# Patient Record
Sex: FEMALE | Race: WHITE | NOT HISPANIC OR LATINO | Employment: OTHER | URBAN - METROPOLITAN AREA
[De-identification: names, ages, dates, MRNs, and addresses within clinical notes are randomized per-mention and may not be internally consistent; named-entity substitution may affect disease eponyms.]

---

## 2024-04-01 DIAGNOSIS — C34.91 PRIMARY ADENOCARCINOMA OF RIGHT LUNG (H): Primary | ICD-10-CM

## 2024-04-03 ENCOUNTER — HOSPITAL ENCOUNTER (OUTPATIENT)
Dept: CT IMAGING | Facility: CLINIC | Age: 84
Discharge: HOME | End: 2024-04-03
Attending: RADIOLOGY | Admitting: RADIOLOGY
Payer: MEDICARE

## 2024-04-03 DIAGNOSIS — C34.91 PRIMARY ADENOCARCINOMA OF RIGHT LUNG (H): ICD-10-CM

## 2024-04-03 LAB
CREAT BLD-MCNC: 1.7 MG/DL (ref 0.5–1)
EGFRCR SERPLBLD CKD-EPI 2021: 29 ML/MIN/1.73M2

## 2024-04-03 PROCEDURE — 250N000009 HC RX 250: Performed by: RADIOLOGY

## 2024-04-03 PROCEDURE — 82565 ASSAY OF CREATININE: CPT

## 2024-04-03 PROCEDURE — 71260 CT THORAX DX C+: CPT | Mod: MA

## 2024-04-03 PROCEDURE — 250N000011 HC RX IP 250 OP 636: Performed by: RADIOLOGY

## 2024-04-03 RX ORDER — IOPAMIDOL 755 MG/ML
88 INJECTION, SOLUTION INTRAVASCULAR ONCE
Status: COMPLETED | OUTPATIENT
Start: 2024-04-03 | End: 2024-04-03

## 2024-04-03 RX ADMIN — IOPAMIDOL 88 ML: 755 INJECTION, SOLUTION INTRAVENOUS at 12:48

## 2024-04-03 RX ADMIN — SODIUM CHLORIDE 60 ML: 9 INJECTION, SOLUTION INTRAVENOUS at 12:48

## 2024-05-26 ENCOUNTER — HOSPITAL ENCOUNTER (INPATIENT)
Facility: CLINIC | Age: 84
LOS: 1 days | Discharge: HOME | DRG: 809 | End: 2024-05-27
Attending: EMERGENCY MEDICINE | Admitting: STUDENT IN AN ORGANIZED HEALTH CARE EDUCATION/TRAINING PROGRAM
Payer: MEDICARE

## 2024-05-26 ENCOUNTER — APPOINTMENT (OUTPATIENT)
Dept: GENERAL RADIOLOGY | Facility: CLINIC | Age: 84
DRG: 809 | End: 2024-05-26
Attending: EMERGENCY MEDICINE
Payer: MEDICARE

## 2024-05-26 DIAGNOSIS — R53.1 GENERAL WEAKNESS: ICD-10-CM

## 2024-05-26 DIAGNOSIS — T45.1X5A ANTINEOPLASTIC CHEMOTHERAPY INDUCED PANCYTOPENIA (H): ICD-10-CM

## 2024-05-26 DIAGNOSIS — R06.09 DOE (DYSPNEA ON EXERTION): ICD-10-CM

## 2024-05-26 DIAGNOSIS — D61.810 ANTINEOPLASTIC CHEMOTHERAPY INDUCED PANCYTOPENIA (H): ICD-10-CM

## 2024-05-26 LAB
ALBUMIN SERPL BCG-MCNC: 3.5 G/DL (ref 3.5–5.2)
ALBUMIN UR-MCNC: NEGATIVE MG/DL
ALP SERPL-CCNC: 78 U/L (ref 40–150)
ALT SERPL W P-5'-P-CCNC: 19 U/L (ref 0–50)
ANION GAP SERPL CALCULATED.3IONS-SCNC: 10 MMOL/L (ref 7–15)
APPEARANCE UR: CLEAR
AST SERPL W P-5'-P-CCNC: 15 U/L (ref 0–45)
BASOPHILS # BLD AUTO: 0 10E3/UL (ref 0–0.2)
BASOPHILS NFR BLD AUTO: 0 %
BILIRUB SERPL-MCNC: 0.3 MG/DL
BILIRUB UR QL STRIP: NEGATIVE
BUN SERPL-MCNC: 23.1 MG/DL (ref 8–23)
CALCIUM SERPL-MCNC: 9.1 MG/DL (ref 8.8–10.2)
CHLORIDE SERPL-SCNC: 102 MMOL/L (ref 98–107)
COLOR UR AUTO: NORMAL
CREAT SERPL-MCNC: 1.51 MG/DL (ref 0.51–0.95)
D DIMER PPP FEU-MCNC: 0.33 UG/ML FEU (ref 0–0.5)
DEPRECATED HCO3 PLAS-SCNC: 26 MMOL/L (ref 22–29)
EGFRCR SERPLBLD CKD-EPI 2021: 34 ML/MIN/1.73M2
EOSINOPHIL # BLD AUTO: 0 10E3/UL (ref 0–0.7)
EOSINOPHIL NFR BLD AUTO: 1 %
ERYTHROCYTE [DISTWIDTH] IN BLOOD BY AUTOMATED COUNT: 17.6 % (ref 10–15)
FLUAV RNA SPEC QL NAA+PROBE: NEGATIVE
FLUBV RNA RESP QL NAA+PROBE: NEGATIVE
GLUCOSE SERPL-MCNC: 103 MG/DL (ref 70–99)
GLUCOSE UR STRIP-MCNC: NEGATIVE MG/DL
HCT VFR BLD AUTO: 27.6 % (ref 35–47)
HGB BLD-MCNC: 9.2 G/DL (ref 11.7–15.7)
HGB UR QL STRIP: NEGATIVE
HOLD SPECIMEN: NORMAL
IMM GRANULOCYTES # BLD: 0 10E3/UL
IMM GRANULOCYTES NFR BLD: 0 %
KETONES UR STRIP-MCNC: NEGATIVE MG/DL
LEUKOCYTE ESTERASE UR QL STRIP: NEGATIVE
LYMPHOCYTES # BLD AUTO: 0.4 10E3/UL (ref 0.8–5.3)
LYMPHOCYTES NFR BLD AUTO: 13 %
MAGNESIUM SERPL-MCNC: 1.5 MG/DL (ref 1.7–2.3)
MCH RBC QN AUTO: 34.1 PG (ref 26.5–33)
MCHC RBC AUTO-ENTMCNC: 33.3 G/DL (ref 31.5–36.5)
MCV RBC AUTO: 102 FL (ref 78–100)
MONOCYTES # BLD AUTO: 0.3 10E3/UL (ref 0–1.3)
MONOCYTES NFR BLD AUTO: 11 %
NEUTROPHILS # BLD AUTO: 2.1 10E3/UL (ref 1.6–8.3)
NEUTROPHILS NFR BLD AUTO: 75 %
NITRATE UR QL: NEGATIVE
NRBC # BLD AUTO: 0 10E3/UL
NRBC BLD AUTO-RTO: 0 /100
NT-PROBNP SERPL-MCNC: 606 PG/ML (ref 0–1800)
PH UR STRIP: 6.5 [PH] (ref 5–7)
PHOSPHATE SERPL-MCNC: 4.4 MG/DL (ref 2.5–4.5)
PLATELET # BLD AUTO: 116 10E3/UL (ref 150–450)
POTASSIUM SERPL-SCNC: 4.6 MMOL/L (ref 3.4–5.3)
PROCALCITONIN SERPL IA-MCNC: 0.04 NG/ML
PROT SERPL-MCNC: 6.1 G/DL (ref 6.4–8.3)
RBC # BLD AUTO: 2.7 10E6/UL (ref 3.8–5.2)
RBC URINE: 0 /HPF
RSV RNA SPEC NAA+PROBE: NEGATIVE
SARS-COV-2 RNA RESP QL NAA+PROBE: NEGATIVE
SODIUM SERPL-SCNC: 138 MMOL/L (ref 135–145)
SP GR UR STRIP: 1.01 (ref 1–1.03)
SQUAMOUS EPITHELIAL: 1 /HPF
TROPONIN T SERPL HS-MCNC: 20 NG/L
TROPONIN T SERPL HS-MCNC: 20 NG/L
UROBILINOGEN UR STRIP-MCNC: NORMAL MG/DL
WBC # BLD AUTO: 2.8 10E3/UL (ref 4–11)
WBC URINE: 3 /HPF

## 2024-05-26 PROCEDURE — 99222 1ST HOSP IP/OBS MODERATE 55: CPT | Performed by: NURSE PRACTITIONER

## 2024-05-26 PROCEDURE — 87040 BLOOD CULTURE FOR BACTERIA: CPT | Performed by: NURSE PRACTITIONER

## 2024-05-26 PROCEDURE — 250N000013 HC RX MED GY IP 250 OP 250 PS 637: Performed by: NURSE PRACTITIONER

## 2024-05-26 PROCEDURE — 81001 URINALYSIS AUTO W/SCOPE: CPT | Performed by: EMERGENCY MEDICINE

## 2024-05-26 PROCEDURE — 258N000003 HC RX IP 258 OP 636: Performed by: NURSE PRACTITIONER

## 2024-05-26 PROCEDURE — 83735 ASSAY OF MAGNESIUM: CPT | Performed by: EMERGENCY MEDICINE

## 2024-05-26 PROCEDURE — 36415 COLL VENOUS BLD VENIPUNCTURE: CPT | Performed by: EMERGENCY MEDICINE

## 2024-05-26 PROCEDURE — 96375 TX/PRO/DX INJ NEW DRUG ADDON: CPT

## 2024-05-26 PROCEDURE — 84145 PROCALCITONIN (PCT): CPT | Performed by: NURSE PRACTITIONER

## 2024-05-26 PROCEDURE — 258N000003 HC RX IP 258 OP 636: Performed by: EMERGENCY MEDICINE

## 2024-05-26 PROCEDURE — 96365 THER/PROPH/DIAG IV INF INIT: CPT

## 2024-05-26 PROCEDURE — 83880 ASSAY OF NATRIURETIC PEPTIDE: CPT | Performed by: EMERGENCY MEDICINE

## 2024-05-26 PROCEDURE — 93005 ELECTROCARDIOGRAM TRACING: CPT

## 2024-05-26 PROCEDURE — 71046 X-RAY EXAM CHEST 2 VIEWS: CPT

## 2024-05-26 PROCEDURE — 250N000011 HC RX IP 250 OP 636: Performed by: EMERGENCY MEDICINE

## 2024-05-26 PROCEDURE — 87637 SARSCOV2&INF A&B&RSV AMP PRB: CPT | Performed by: EMERGENCY MEDICINE

## 2024-05-26 PROCEDURE — 36415 COLL VENOUS BLD VENIPUNCTURE: CPT | Performed by: NURSE PRACTITIONER

## 2024-05-26 PROCEDURE — 96366 THER/PROPH/DIAG IV INF ADDON: CPT

## 2024-05-26 PROCEDURE — 87040 BLOOD CULTURE FOR BACTERIA: CPT | Performed by: EMERGENCY MEDICINE

## 2024-05-26 PROCEDURE — 250N000013 HC RX MED GY IP 250 OP 250 PS 637: Performed by: HOSPITALIST

## 2024-05-26 PROCEDURE — 84484 ASSAY OF TROPONIN QUANT: CPT | Performed by: EMERGENCY MEDICINE

## 2024-05-26 PROCEDURE — 80053 COMPREHEN METABOLIC PANEL: CPT | Performed by: EMERGENCY MEDICINE

## 2024-05-26 PROCEDURE — 120N000001 HC R&B MED SURG/OB

## 2024-05-26 PROCEDURE — 84100 ASSAY OF PHOSPHORUS: CPT | Performed by: NURSE PRACTITIONER

## 2024-05-26 PROCEDURE — 96361 HYDRATE IV INFUSION ADD-ON: CPT

## 2024-05-26 PROCEDURE — 85379 FIBRIN DEGRADATION QUANT: CPT | Performed by: EMERGENCY MEDICINE

## 2024-05-26 PROCEDURE — 99285 EMERGENCY DEPT VISIT HI MDM: CPT | Mod: 25

## 2024-05-26 PROCEDURE — 85025 COMPLETE CBC W/AUTO DIFF WBC: CPT | Performed by: EMERGENCY MEDICINE

## 2024-05-26 RX ORDER — SPIRONOLACTONE 25 MG/1
25 TABLET ORAL DAILY
COMMUNITY

## 2024-05-26 RX ORDER — CITALOPRAM HYDROBROMIDE 40 MG/1
40 TABLET ORAL DAILY
COMMUNITY

## 2024-05-26 RX ORDER — PRENATAL VIT/IRON FUM/FOLIC AC 27MG-0.8MG
1 TABLET ORAL DAILY
Status: DISCONTINUED | OUTPATIENT
Start: 2024-05-27 | End: 2024-05-27 | Stop reason: HOSPADM

## 2024-05-26 RX ORDER — FUROSEMIDE 20 MG
20 TABLET ORAL DAILY
Status: DISCONTINUED | OUTPATIENT
Start: 2024-05-27 | End: 2024-05-26

## 2024-05-26 RX ORDER — FLUTICASONE PROPIONATE 50 MCG
2 SPRAY, SUSPENSION (ML) NASAL DAILY
COMMUNITY

## 2024-05-26 RX ORDER — AMOXICILLIN 250 MG
1 CAPSULE ORAL 2 TIMES DAILY PRN
Status: DISCONTINUED | OUTPATIENT
Start: 2024-05-26 | End: 2024-05-27 | Stop reason: HOSPADM

## 2024-05-26 RX ORDER — ONDANSETRON 2 MG/ML
4 INJECTION INTRAMUSCULAR; INTRAVENOUS EVERY 6 HOURS PRN
Status: DISCONTINUED | OUTPATIENT
Start: 2024-05-26 | End: 2024-05-26

## 2024-05-26 RX ORDER — FUROSEMIDE 20 MG
20 TABLET ORAL DAILY
Status: ON HOLD | COMMUNITY
End: 2024-05-27

## 2024-05-26 RX ORDER — ALBUTEROL SULFATE 90 UG/1
1-2 AEROSOL, METERED RESPIRATORY (INHALATION) EVERY 4 HOURS PRN
COMMUNITY

## 2024-05-26 RX ORDER — ACETAMINOPHEN 325 MG/1
650 TABLET ORAL EVERY 4 HOURS PRN
Status: DISCONTINUED | OUTPATIENT
Start: 2024-05-26 | End: 2024-05-27 | Stop reason: HOSPADM

## 2024-05-26 RX ORDER — ONDANSETRON 4 MG/1
8 TABLET, FILM COATED ORAL EVERY 8 HOURS PRN
Status: DISCONTINUED | OUTPATIENT
Start: 2024-05-26 | End: 2024-05-26

## 2024-05-26 RX ORDER — FLUTICASONE PROPIONATE 50 MCG
2 SPRAY, SUSPENSION (ML) NASAL DAILY
Status: DISCONTINUED | OUTPATIENT
Start: 2024-05-27 | End: 2024-05-27 | Stop reason: HOSPADM

## 2024-05-26 RX ORDER — SIMETHICONE 80 MG
80 TABLET,CHEWABLE ORAL EVERY 6 HOURS PRN
Status: DISCONTINUED | OUTPATIENT
Start: 2024-05-26 | End: 2024-05-27 | Stop reason: HOSPADM

## 2024-05-26 RX ORDER — PROCHLORPERAZINE MALEATE 10 MG
10 TABLET ORAL EVERY 6 HOURS PRN
Status: DISCONTINUED | OUTPATIENT
Start: 2024-05-26 | End: 2024-05-26

## 2024-05-26 RX ORDER — FAMOTIDINE 20 MG/1
20 TABLET, FILM COATED ORAL 2 TIMES DAILY
Status: DISCONTINUED | OUTPATIENT
Start: 2024-05-26 | End: 2024-05-27 | Stop reason: HOSPADM

## 2024-05-26 RX ORDER — LIDOCAINE 40 MG/G
CREAM TOPICAL
Status: DISCONTINUED | OUTPATIENT
Start: 2024-05-26 | End: 2024-05-27 | Stop reason: HOSPADM

## 2024-05-26 RX ORDER — ALBUTEROL SULFATE 90 UG/1
1-2 AEROSOL, METERED RESPIRATORY (INHALATION) EVERY 4 HOURS PRN
Status: DISCONTINUED | OUTPATIENT
Start: 2024-05-26 | End: 2024-05-27 | Stop reason: HOSPADM

## 2024-05-26 RX ORDER — ONDANSETRON 2 MG/ML
4 INJECTION INTRAMUSCULAR; INTRAVENOUS ONCE
Status: COMPLETED | OUTPATIENT
Start: 2024-05-26 | End: 2024-05-26

## 2024-05-26 RX ORDER — PANTOPRAZOLE SODIUM 40 MG/1
40 TABLET, DELAYED RELEASE ORAL DAILY
Status: DISCONTINUED | OUTPATIENT
Start: 2024-05-27 | End: 2024-05-27 | Stop reason: HOSPADM

## 2024-05-26 RX ORDER — ONDANSETRON 2 MG/ML
4 INJECTION INTRAMUSCULAR; INTRAVENOUS EVERY 8 HOURS PRN
Status: DISCONTINUED | OUTPATIENT
Start: 2024-05-26 | End: 2024-05-27 | Stop reason: HOSPADM

## 2024-05-26 RX ORDER — IPRATROPIUM BROMIDE AND ALBUTEROL SULFATE 2.5; .5 MG/3ML; MG/3ML
3 SOLUTION RESPIRATORY (INHALATION) EVERY 4 HOURS PRN
Status: DISCONTINUED | OUTPATIENT
Start: 2024-05-26 | End: 2024-05-27 | Stop reason: HOSPADM

## 2024-05-26 RX ORDER — ATORVASTATIN CALCIUM 40 MG/1
40 TABLET, FILM COATED ORAL DAILY
COMMUNITY

## 2024-05-26 RX ORDER — ACETAMINOPHEN 650 MG/1
650 SUPPOSITORY RECTAL EVERY 4 HOURS PRN
Status: DISCONTINUED | OUTPATIENT
Start: 2024-05-26 | End: 2024-05-27 | Stop reason: HOSPADM

## 2024-05-26 RX ORDER — OMEPRAZOLE 20 MG/1
20 TABLET, DELAYED RELEASE ORAL DAILY
COMMUNITY

## 2024-05-26 RX ORDER — SPIRONOLACTONE 25 MG/1
25 TABLET ORAL DAILY
Status: DISCONTINUED | OUTPATIENT
Start: 2024-05-27 | End: 2024-05-26

## 2024-05-26 RX ORDER — FLUTICASONE FUROATE AND VILANTEROL 200; 25 UG/1; UG/1
1 POWDER RESPIRATORY (INHALATION) DAILY
COMMUNITY

## 2024-05-26 RX ORDER — CALCIUM CARBONATE 500 MG/1
1000 TABLET, CHEWABLE ORAL 4 TIMES DAILY PRN
Status: DISCONTINUED | OUTPATIENT
Start: 2024-05-26 | End: 2024-05-27 | Stop reason: HOSPADM

## 2024-05-26 RX ORDER — LANOLIN ALCOHOL/MO/W.PET/CERES
3 CREAM (GRAM) TOPICAL
Status: DISCONTINUED | OUTPATIENT
Start: 2024-05-26 | End: 2024-05-27 | Stop reason: HOSPADM

## 2024-05-26 RX ORDER — FLUTICASONE FUROATE AND VILANTEROL 200; 25 UG/1; UG/1
1 POWDER RESPIRATORY (INHALATION) DAILY
Status: DISCONTINUED | OUTPATIENT
Start: 2024-05-27 | End: 2024-05-27 | Stop reason: HOSPADM

## 2024-05-26 RX ORDER — AMOXICILLIN 250 MG
2 CAPSULE ORAL 2 TIMES DAILY PRN
Status: DISCONTINUED | OUTPATIENT
Start: 2024-05-26 | End: 2024-05-27 | Stop reason: HOSPADM

## 2024-05-26 RX ORDER — ONDANSETRON 8 MG/1
8 TABLET, FILM COATED ORAL EVERY 8 HOURS PRN
COMMUNITY

## 2024-05-26 RX ORDER — MAGNESIUM SULFATE HEPTAHYDRATE 40 MG/ML
2 INJECTION, SOLUTION INTRAVENOUS ONCE
Status: COMPLETED | OUTPATIENT
Start: 2024-05-26 | End: 2024-05-26

## 2024-05-26 RX ORDER — ATORVASTATIN CALCIUM 40 MG/1
40 TABLET, FILM COATED ORAL DAILY
Status: DISCONTINUED | OUTPATIENT
Start: 2024-05-27 | End: 2024-05-27 | Stop reason: HOSPADM

## 2024-05-26 RX ORDER — METOPROLOL SUCCINATE 50 MG/1
50 TABLET, EXTENDED RELEASE ORAL DAILY
COMMUNITY

## 2024-05-26 RX ORDER — CITALOPRAM HYDROBROMIDE 20 MG/1
40 TABLET ORAL DAILY
Status: DISCONTINUED | OUTPATIENT
Start: 2024-05-27 | End: 2024-05-27 | Stop reason: HOSPADM

## 2024-05-26 RX ORDER — METOPROLOL SUCCINATE 50 MG/1
50 TABLET, EXTENDED RELEASE ORAL DAILY
Status: DISCONTINUED | OUTPATIENT
Start: 2024-05-27 | End: 2024-05-27 | Stop reason: HOSPADM

## 2024-05-26 RX ORDER — CETIRIZINE HYDROCHLORIDE 10 MG/1
10 TABLET ORAL DAILY PRN
Status: DISCONTINUED | OUTPATIENT
Start: 2024-05-26 | End: 2024-05-27 | Stop reason: HOSPADM

## 2024-05-26 RX ORDER — FAMOTIDINE 20 MG/1
20 TABLET, FILM COATED ORAL 2 TIMES DAILY
COMMUNITY

## 2024-05-26 RX ORDER — AMOXICILLIN 500 MG
1200 CAPSULE ORAL DAILY
COMMUNITY

## 2024-05-26 RX ORDER — SODIUM CHLORIDE, SODIUM LACTATE, POTASSIUM CHLORIDE, CALCIUM CHLORIDE 600; 310; 30; 20 MG/100ML; MG/100ML; MG/100ML; MG/100ML
INJECTION, SOLUTION INTRAVENOUS CONTINUOUS
Status: DISCONTINUED | OUTPATIENT
Start: 2024-05-26 | End: 2024-05-27

## 2024-05-26 RX ORDER — CETIRIZINE HYDROCHLORIDE 10 MG/1
10 TABLET ORAL DAILY PRN
COMMUNITY

## 2024-05-26 RX ORDER — PROCHLORPERAZINE MALEATE 10 MG
10 TABLET ORAL EVERY 6 HOURS PRN
COMMUNITY

## 2024-05-26 RX ADMIN — ONDANSETRON 4 MG: 2 INJECTION INTRAMUSCULAR; INTRAVENOUS at 14:36

## 2024-05-26 RX ADMIN — Medication 3 MG: at 23:57

## 2024-05-26 RX ADMIN — ACETAMINOPHEN 650 MG: 325 TABLET, FILM COATED ORAL at 21:37

## 2024-05-26 RX ADMIN — FAMOTIDINE 20 MG: 20 TABLET ORAL at 21:37

## 2024-05-26 RX ADMIN — APIXABAN 2.5 MG: 2.5 TABLET, FILM COATED ORAL at 21:37

## 2024-05-26 RX ADMIN — MAGNESIUM SULFATE HEPTAHYDRATE 2 G: 40 INJECTION, SOLUTION INTRAVENOUS at 15:20

## 2024-05-26 RX ADMIN — SODIUM CHLORIDE, POTASSIUM CHLORIDE, SODIUM LACTATE AND CALCIUM CHLORIDE: 600; 310; 30; 20 INJECTION, SOLUTION INTRAVENOUS at 20:52

## 2024-05-26 RX ADMIN — SODIUM CHLORIDE 500 ML: 9 INJECTION, SOLUTION INTRAVENOUS at 14:32

## 2024-05-26 ASSESSMENT — ACTIVITIES OF DAILY LIVING (ADL)
ADLS_ACUITY_SCORE: 37
ADLS_ACUITY_SCORE: 35
ADLS_ACUITY_SCORE: 37
TOILETING_ISSUES: NO
DIFFICULTY_EATING/SWALLOWING: NO
ADLS_ACUITY_SCORE: 20
DIFFICULTY_COMMUNICATING: NO
CHANGE_IN_FUNCTIONAL_STATUS_SINCE_ONSET_OF_CURRENT_ILLNESS/INJURY: NO
ADLS_ACUITY_SCORE: 37
ADLS_ACUITY_SCORE: 35
WALKING_OR_CLIMBING_STAIRS_DIFFICULTY: NO
HEARING_DIFFICULTY_OR_DEAF: NO
WEAR_GLASSES_OR_BLIND: NO
ADLS_ACUITY_SCORE: 20
DOING_ERRANDS_INDEPENDENTLY_DIFFICULTY: NO
ADLS_ACUITY_SCORE: 37
FALL_HISTORY_WITHIN_LAST_SIX_MONTHS: NO
DRESSING/BATHING_DIFFICULTY: NO
CONCENTRATING,_REMEMBERING_OR_MAKING_DECISIONS_DIFFICULTY: NO

## 2024-05-26 ASSESSMENT — COLUMBIA-SUICIDE SEVERITY RATING SCALE - C-SSRS
6. HAVE YOU EVER DONE ANYTHING, STARTED TO DO ANYTHING, OR PREPARED TO DO ANYTHING TO END YOUR LIFE?: NO
2. HAVE YOU ACTUALLY HAD ANY THOUGHTS OF KILLING YOURSELF IN THE PAST MONTH?: NO
1. IN THE PAST MONTH, HAVE YOU WISHED YOU WERE DEAD OR WISHED YOU COULD GO TO SLEEP AND NOT WAKE UP?: NO

## 2024-05-26 NOTE — ED TRIAGE NOTES
Patient reports increasing fatigue and generalized weakness since yesterday. Hx of lung cancer and chemo.

## 2024-05-26 NOTE — ED PROVIDER NOTES
Emergency Department Note      History of Present Illness     Chief Complaint  Generalized Weakness and Fatigue    HPI  Andie Biswas is a 84 year old female on Eliquis with a history of lung cancer, hypertension, hyperlipidemia, and GERD, who presents to the ED for generalized weakness and fatigue. The symptoms started on Thursday (5/23/24) following her last round of chemotherapy. Andie undergoes radiation daily for six weeks, and chemotherapy weekly. The patient endorses symptoms of nausea, abdominal pain, body aches, shakiness, weakness, and shortness of breath. She takes prescribed medications for the nausea which has provided little relief. The patient denies any chest pain, fever, cough, diarrhea, bloody stool, change in fluid or food intake, or difficulty urinating. There have been no new medications or changes in medications. Of note, the patient has a pacemaker and a chest port. No history of similar symptoms following cancer treatments. She is followed [facility].     Independent Historian  Son present at bedside and corroborates the above.     Review of External Notes  I reviewed the oncology office visit from 5/6/2024 reviewing her cancer history.  Past Medical History   Medical History and Problem List  Malignant neoplasm   Glucose intolerance  Ischemic cardiomyopathy  CKD  A-fib, paroxysmal   Hyperlipidemia   Ventricular tachycardia, nonsustained  Heart failure   Anxiety   MDD  Asthma  Hypertension   GERD   Osteoporosis  PAD  COPD   CAD  Pacemaker  Intracranial aneurysm     Medications  Albuterol  Zyrtec  Eliquis  Spironolactone  Entresto   Citalopram  Atorvastatin   Metoprolol succinate   Famotidine   Furosemide  Omeprazole  Prochlorperazine   Ondansetron     Surgical History   Chest port placement   Tubal ligation  Gall bladder removal   Stents in heart  Brain stents  Cataract removal   Physical Exam   Patient Vitals for the past 24 hrs:   BP Temp Temp src Pulse Resp  SpO2   05/26/24 1904 -- -- -- 60 -- 95 %   05/26/24 1900 (!) 148/65 -- -- 62 -- 96 %   05/26/24 1839 -- -- -- 65 -- 96 %   05/26/24 1824 -- -- -- 61 -- 94 %   05/26/24 1815 -- -- -- 62 -- 95 %   05/26/24 1801 (!) 140/60 -- -- 60 -- 95 %   05/26/24 1743 -- -- -- 68 -- 96 %   05/26/24 1742 (!) 146/65 -- -- 65 -- --   05/26/24 1730 128/56 -- -- 68 -- 96 %   05/26/24 1700 -- -- -- 62 -- 95 %   05/26/24 1658 125/74 -- -- 60 -- --   05/26/24 1645 -- -- -- 59 -- 94 %   05/26/24 1635 137/65 -- -- 60 -- 94 %   05/26/24 1628 -- -- -- 60 -- 93 %   05/26/24 1613 -- -- -- 60 -- 94 %   05/26/24 1604 (!) 144/68 -- -- 54 -- 95 %   05/26/24 1542 -- -- -- 61 -- 94 %   05/26/24 1536 -- -- -- 60 -- 93 %   05/26/24 1521 -- -- -- 60 -- 95 %   05/26/24 1512 -- -- -- -- -- 95 %   05/26/24 1457 -- -- -- -- -- 95 %   05/26/24 1442 -- -- -- -- -- 95 %   05/26/24 1432 -- -- -- -- -- 96 %   05/26/24 1431 -- -- -- -- -- 93 %   05/26/24 1430 -- -- -- -- -- 90 %   05/26/24 1429 (!) 140/64 -- -- 60 -- --   05/26/24 1334 137/71 97.5  F (36.4  C) Oral 63 16 97 %     Physical Exam  Vitals and nursing note reviewed.   Constitutional:       General: She is not in acute distress.     Appearance: She is ill-appearing. She is not toxic-appearing.   HENT:      Head: Atraumatic.      Right Ear: External ear normal.      Left Ear: External ear normal.      Nose: Nose normal.      Mouth/Throat:      Mouth: Mucous membranes are moist.   Eyes:      Extraocular Movements: Extraocular movements intact.      Conjunctiva/sclera: Conjunctivae normal.   Cardiovascular:      Rate and Rhythm: Normal rate and regular rhythm.      Heart sounds: No murmur heard.     Comments: + Pacer/AICD  + Port in right chest  Pulmonary:      Effort: Pulmonary effort is normal. No respiratory distress.      Breath sounds: Normal breath sounds. No wheezing, rhonchi or rales.   Abdominal:      General: Abdomen is flat. Bowel sounds are normal. There is no distension.      Palpations:  Abdomen is soft.      Tenderness: There is no abdominal tenderness. There is no guarding or rebound.   Musculoskeletal:         General: No deformity or signs of injury.      Cervical back: Normal range of motion and neck supple.   Skin:     General: Skin is warm and dry.      Findings: No rash.   Neurological:      Mental Status: She is alert and oriented to person, place, and time.      Cranial Nerves: No cranial nerve deficit.      Sensory: No sensory deficit.      Motor: No weakness.   Psychiatric:         Behavior: Behavior normal.           Diagnostics   Lab Results   Labs Ordered and Resulted from Time of ED Arrival to Time of ED Departure   COMPREHENSIVE METABOLIC PANEL - Abnormal       Result Value    Sodium 138      Potassium 4.6      Carbon Dioxide (CO2) 26      Anion Gap 10      Urea Nitrogen 23.1 (*)     Creatinine 1.51 (*)     GFR Estimate 34 (*)     Calcium 9.1      Chloride 102      Glucose 103 (*)     Alkaline Phosphatase 78      AST 15      ALT 19      Protein Total 6.1 (*)     Albumin 3.5      Bilirubin Total 0.3     TROPONIN T, HIGH SENSITIVITY - Abnormal    Troponin T, High Sensitivity 20 (*)    MAGNESIUM - Abnormal    Magnesium 1.5 (*)    CBC WITH PLATELETS AND DIFFERENTIAL - Abnormal    WBC Count 2.8 (*)     RBC Count 2.70 (*)     Hemoglobin 9.2 (*)     Hematocrit 27.6 (*)      (*)     MCH 34.1 (*)     MCHC 33.3      RDW 17.6 (*)     Platelet Count 116 (*)     % Neutrophils 75      % Lymphocytes 13      % Monocytes 11      % Eosinophils 1      % Basophils 0      % Immature Granulocytes 0      NRBCs per 100 WBC 0      Absolute Neutrophils 2.1      Absolute Lymphocytes 0.4 (*)     Absolute Monocytes 0.3      Absolute Eosinophils 0.0      Absolute Basophils 0.0      Absolute Immature Granulocytes 0.0      Absolute NRBCs 0.0     TROPONIN T, HIGH SENSITIVITY - Abnormal    Troponin T, High Sensitivity 20 (*)    ROUTINE UA WITH MICROSCOPIC REFLEX TO CULTURE - Normal    Color Urine Light Yellow       Appearance Urine Clear      Glucose Urine Negative      Bilirubin Urine Negative      Ketones Urine Negative      Specific Gravity Urine 1.008      Blood Urine Negative      pH Urine 6.5      Protein Albumin Urine Negative      Urobilinogen Urine Normal      Nitrite Urine Negative      Leukocyte Esterase Urine Negative      RBC Urine 0      WBC Urine 3      Squamous Epithelials Urine 1     D DIMER QUANTITATIVE - Normal    D-Dimer Quantitative 0.33     NT PROBNP INPATIENT   PROCALCITONIN   INFLUENZA A/B, RSV, & SARS-COV2 PCR   PHOSPHORUS   BLOOD CULTURE   BLOOD CULTURE       Imaging  XR Chest 2 Views   Final Result   IMPRESSION: Cardiac pacemaker/ICD in place. Right-sided Port-A-Cath remains in the distal SVC. Heart size is normal. Dense consolidations in the aortic arch. No CHF, lobar consolidation or effusion. No pneumothorax.          EKG     ECG results from 05/26/24   EKG 12-lead, tracing only     Value    Systolic Blood Pressure     Diastolic Blood Pressure     Ventricular Rate 61    Atrial Rate 61    PA Interval 126    QRS Duration 154        QTc 511    P Axis 118    R AXIS 151    T Axis -30    Interpretation ECG      Atrial-paced rhythm  Right bundle branch block  Septal infarct , age undetermined  Possible Lateral infarct , age undetermined  T wave abnormality, consider inferior ischemia  Interpreted by me at 1205         Independent Interpretation  CXR: No pneumothorax or infiltrate.  ED Course    Medications Administered  Medications   ipratropium - albuterol 0.5 mg/2.5 mg/3 mL (DUONEB) neb solution 3 mL (has no administration in time range)   lidocaine 1 % 0.1-1 mL (has no administration in time range)   lidocaine (LMX4) cream (has no administration in time range)   sodium chloride (PF) 0.9% PF flush 3 mL (has no administration in time range)   sodium chloride (PF) 0.9% PF flush 3 mL (has no administration in time range)   senna-docusate (SENOKOT-S/PERICOLACE) 8.6-50 MG per tablet 1 tablet (has  no administration in time range)     Or   senna-docusate (SENOKOT-S/PERICOLACE) 8.6-50 MG per tablet 2 tablet (has no administration in time range)   calcium carbonate (TUMS) chewable tablet 1,000 mg (has no administration in time range)   ondansetron (ZOFRAN) injection 4 mg (has no administration in time range)   acetaminophen (TYLENOL) tablet 650 mg (has no administration in time range)     Or   acetaminophen (TYLENOL) Suppository 650 mg (has no administration in time range)   sodium chloride 0.9% BOLUS 500 mL (0 mLs Intravenous Stopped 5/26/24 1542)   ondansetron (ZOFRAN) injection 4 mg (4 mg Intravenous $Given 5/26/24 1436)   magnesium sulfate 2 g in 50 mL sterile water intermittent infusion (0 g Intravenous Stopped 5/26/24 1941)       Procedures  Procedures     Discussion of Management  Admitting Hospitalist, Carson ROMO for Dr Page    Social Determinants of Health adding to complexity of care  None    ED Course  ED Course as of 05/26/24 2003   Sun May 26, 2024   1406 I obtained the history and examined the patient as above.    1613 I rechecked and updated the patient as above      Medical Decision Making / Diagnosis       MIPS     None    [facility]  Andie VALENTIN Ludin Biswas is a 84 year old female who presents with severe generalized weakness and fatigue over the past couple of days after her last round of chemotherapy.  She does not have any other real significant symptoms to suggest etiology of her symptoms.  Given that she is on chemotherapy, differential diagnosis includes severe anemia, infection, renal failure, electrolyte abnormality, MI, etc.  Her EKG shows no ischemia and her troponin is mildly elevated but remained stable on 2-hour repeat.  She is leukopenic and pancytopenic but no fever or other infectious symptoms.  Her UA is negative.  Chest x-ray shows no signs of pneumonia.  Electrolytes and kidney function are at baseline.  She is anemic but this is also at baseline.  Is unclear what is causing her  severe fatigue.  I did discuss with the on-call oncologist for [facility], Dr. Valentine.  We attempted to take patient for a road test and she appeared severely dyspneic with exertion.  She is anticoagulated but increased risk for PE so we did send a D-dimer and this came back normal.  Given her ongoing severe weakness and dyspnea on exertion, we will plan to admit for further care and cardiac evaluation.  I discussed with hospitalist team who accepted admission.    Disposition  The patient was admitted to the hospital.     ICD-10 Codes:    ICD-10-CM    1. General weakness  R53.1       2. ZENG (dyspnea on exertion)  R06.09       3. Antineoplastic chemotherapy induced pancytopenia (H24)  D61.810     T45.1X5A            Discharge Medications  New Prescriptions    No medications on file     Scribe Disclosure:  IAbbie, am serving as a scribe at 2:22 PM on 5/26/2024 to document services personally performed by Eduardo Viramnotes MD based on my observations and the provider's statements to me.     Scribe Disclosure:  Pilar VALENTIN, am serving as the scribe  for Abbie Sosa, the scribe trainee, at 4:06 PM on 5/26/2024 to document services personally performed by Eduardo Viramontes MD based on our observations and the provider's statements to us.       Eduardo Viramontes MD  05/26/24 2009

## 2024-05-26 NOTE — ED NOTES
[facility]  ED Nurse Handoff Report    ED Chief complaint: Generalized Weakness and Fatigue  . ED Diagnosis:   Final diagnoses:   None       Allergies: No Known Allergies    Code Status: Full Code    Activity level - Baseline/Home:  independent.  Activity Level - Current:   standby.   Lift room needed: No.   Bariatric: No   Needed: No   Isolation: No.   Infection: Not Applicable.     Respiratory status: Room air    Vital Signs (within 30 minutes):   Vitals:    05/26/24 1730 05/26/24 1742 05/26/24 1743 05/26/24 1801   BP: 128/56 (!) 146/65  (!) 140/60   Pulse: 68 65 68 60   Resp:       Temp:       TempSrc:       SpO2: 96%  96% 95%       Cardiac Rhythm:  ,   Cardiac  Cardiac Rhythm: Normal sinus rhythm  Pain level:    Patient confused: No.   Patient Falls Risk: nonskid shoes/slippers when out of bed, patient and family education, and assistive device/personal items within reach.   Elimination Status: Has voided     Patient Report - Initial Complaint: generalized weakness, fatigue.   Andie Biswas is a 84 year old female on [facility] with a history of lung cancer, hypertension, hyperlipidemia, and GERD, who presents to the ED for generalized weakness and fatigue. The symptoms onset on Thursday (5/23/24) following her last round of chemotherapy. Andie undergoes radiation daily for six weeks, and chemotherapy weekly. The patient endorses symptoms of nausea, abdominal pain, body aches, shakiness, weakness, and shortness of breath. She takes prescribed medications for the nausea which has provided little relief. The patient denies any chest pain, fever, cough, diarrhea, bloody stool, change in fluid or food intake, or difficulty urinating. There have been no new medications or changes in medications. Of note, the patient has a pacemaker and a chest port. No history of similar symptoms following cancer treatments. [facility] is [facility].   Focused Assessment:  Patient reports increasing fatigue and generalized weakness since yesterday. Hx of lung cancer and chemo.      Abnormal Results:   Labs Ordered and Resulted from Time of ED Arrival to Time of ED Departure   COMPREHENSIVE METABOLIC PANEL - Abnormal       Result Value    Sodium 138      Potassium 4.6      Carbon Dioxide (CO2) 26      Anion Gap 10      Urea Nitrogen 23.1 (*)     Creatinine 1.51 (*)     GFR Estimate 34 (*)     Calcium 9.1      Chloride 102      Glucose 103 (*)     Alkaline Phosphatase 78      AST 15      ALT 19      Protein Total 6.1 (*)     Albumin 3.5      Bilirubin Total 0.3     TROPONIN T, HIGH SENSITIVITY - Abnormal    Troponin T, High Sensitivity 20 (*)    MAGNESIUM - Abnormal    Magnesium 1.5 (*)    CBC WITH PLATELETS AND DIFFERENTIAL - Abnormal    WBC Count 2.8 (*)     RBC Count 2.70 (*)     Hemoglobin 9.2 (*)     Hematocrit 27.6 (*)      (*)     MCH 34.1 (*)     MCHC 33.3      RDW 17.6 (*)     Platelet Count 116 (*)     % Neutrophils 75      % Lymphocytes 13      % Monocytes 11      % Eosinophils 1      % Basophils 0      % Immature Granulocytes 0      NRBCs per 100 WBC 0      Absolute Neutrophils 2.1      Absolute Lymphocytes 0.4 (*)     Absolute Monocytes 0.3      Absolute Eosinophils 0.0      Absolute Basophils 0.0      Absolute Immature Granulocytes 0.0      Absolute NRBCs 0.0     TROPONIN T, HIGH SENSITIVITY - Abnormal    Troponin T, High Sensitivity 20 (*)    ROUTINE UA WITH MICROSCOPIC REFLEX TO CULTURE - Normal    Color Urine Light Yellow      Appearance Urine Clear      Glucose Urine Negative      Bilirubin Urine Negative      Ketones Urine Negative      Specific Gravity Urine 1.008      Blood Urine Negative      pH Urine 6.5      Protein Albumin Urine Negative      Urobilinogen Urine Normal      Nitrite Urine Negative      Leukocyte Esterase Urine Negative      RBC Urine 0      WBC Urine 3      Squamous Epithelials Urine 1     D DIMER QUANTITATIVE   BLOOD CULTURE   BLOOD  CULTURE        No orders to display       Treatments provided: labs, imaging, see MAR  Family Comments: family at bedside  OBS brochure/video discussed/provided to patient:  N/A  ED Medications:   Medications   sodium chloride 0.9% BOLUS 500 mL (0 mLs Intravenous Stopped 5/26/24 1542)   ondansetron (ZOFRAN) injection 4 mg (4 mg Intravenous $Given 5/26/24 1436)   magnesium sulfate 2 g in 50 mL sterile water intermittent infusion (2 g Intravenous $New Bag 5/26/24 1520)       Drips infusing:  No  For the majority of the shift this patient was Green.   Interventions performed were n/a.    Sepsis treatment initiated: No    Cares/treatment/interventions/medications to be completed following ED care: n/a    ED Nurse Name: Beatrice Guerin RN  6:12 PM     RECEIVING UNIT ED HANDOFF REVIEW    Above ED Nurse Handoff Report was reviewed: Yes  Reviewed by: Dolores Mathias RN on May 26, 2024 at 10:32 PM   BARBIE Magdaleno called the ED to inform them the note was read: Yes

## 2024-05-27 ENCOUNTER — APPOINTMENT (OUTPATIENT)
Dept: CARDIOLOGY | Facility: CLINIC | Age: 84
DRG: 809 | End: 2024-05-27
Attending: NURSE PRACTITIONER
Payer: MEDICARE

## 2024-05-27 VITALS
BODY MASS INDEX: 28.61 KG/M2 | HEART RATE: 65 BPM | HEIGHT: 62 IN | WEIGHT: 155.5 LBS | RESPIRATION RATE: 16 BRPM | DIASTOLIC BLOOD PRESSURE: 57 MMHG | TEMPERATURE: 98.4 F | OXYGEN SATURATION: 93 % | SYSTOLIC BLOOD PRESSURE: 129 MMHG

## 2024-05-27 LAB
ALBUMIN SERPL BCG-MCNC: 3.7 G/DL (ref 3.5–5.2)
ALP SERPL-CCNC: 79 U/L (ref 40–150)
ALT SERPL W P-5'-P-CCNC: 20 U/L (ref 0–50)
ANION GAP SERPL CALCULATED.3IONS-SCNC: 13 MMOL/L (ref 7–15)
AST SERPL W P-5'-P-CCNC: 17 U/L (ref 0–45)
BILIRUB SERPL-MCNC: 0.6 MG/DL
BUN SERPL-MCNC: 22 MG/DL (ref 8–23)
CALCIUM SERPL-MCNC: 8.8 MG/DL (ref 8.8–10.2)
CHLORIDE SERPL-SCNC: 102 MMOL/L (ref 98–107)
CREAT SERPL-MCNC: 1.31 MG/DL (ref 0.51–0.95)
DEPRECATED HCO3 PLAS-SCNC: 24 MMOL/L (ref 22–29)
EGFRCR SERPLBLD CKD-EPI 2021: 40 ML/MIN/1.73M2
ERYTHROCYTE [DISTWIDTH] IN BLOOD BY AUTOMATED COUNT: 17.3 % (ref 10–15)
GLUCOSE SERPL-MCNC: 99 MG/DL (ref 70–99)
HBA1C MFR BLD: 6.5 %
HCT VFR BLD AUTO: 28.5 % (ref 35–47)
HGB BLD-MCNC: 9.5 G/DL (ref 11.7–15.7)
LVEF ECHO: NORMAL
MAGNESIUM SERPL-MCNC: 1.7 MG/DL (ref 1.7–2.3)
MCH RBC QN AUTO: 33.9 PG (ref 26.5–33)
MCHC RBC AUTO-ENTMCNC: 33.3 G/DL (ref 31.5–36.5)
MCV RBC AUTO: 102 FL (ref 78–100)
PLATELET # BLD AUTO: 114 10E3/UL (ref 150–450)
POTASSIUM SERPL-SCNC: 4.4 MMOL/L (ref 3.4–5.3)
PROT SERPL-MCNC: 6.2 G/DL (ref 6.4–8.3)
RBC # BLD AUTO: 2.8 10E6/UL (ref 3.8–5.2)
SODIUM SERPL-SCNC: 139 MMOL/L (ref 135–145)
TSH SERPL DL<=0.005 MIU/L-ACNC: 1.24 UIU/ML (ref 0.3–4.2)
WBC # BLD AUTO: 2.6 10E3/UL (ref 4–11)

## 2024-05-27 PROCEDURE — 80053 COMPREHEN METABOLIC PANEL: CPT | Performed by: NURSE PRACTITIONER

## 2024-05-27 PROCEDURE — 250N000013 HC RX MED GY IP 250 OP 250 PS 637: Performed by: NURSE PRACTITIONER

## 2024-05-27 PROCEDURE — 83735 ASSAY OF MAGNESIUM: CPT | Performed by: NURSE PRACTITIONER

## 2024-05-27 PROCEDURE — 250N000013 HC RX MED GY IP 250 OP 250 PS 637: Performed by: INTERNAL MEDICINE

## 2024-05-27 PROCEDURE — 85027 COMPLETE CBC AUTOMATED: CPT | Performed by: NURSE PRACTITIONER

## 2024-05-27 PROCEDURE — 93306 TTE W/DOPPLER COMPLETE: CPT | Mod: 26 | Performed by: INTERNAL MEDICINE

## 2024-05-27 PROCEDURE — 84443 ASSAY THYROID STIM HORMONE: CPT | Performed by: NURSE PRACTITIONER

## 2024-05-27 PROCEDURE — 250N000011 HC RX IP 250 OP 636: Performed by: INTERNAL MEDICINE

## 2024-05-27 PROCEDURE — 93306 TTE W/DOPPLER COMPLETE: CPT

## 2024-05-27 PROCEDURE — 99239 HOSP IP/OBS DSCHRG MGMT >30: CPT | Performed by: INTERNAL MEDICINE

## 2024-05-27 PROCEDURE — 83036 HEMOGLOBIN GLYCOSYLATED A1C: CPT | Performed by: NURSE PRACTITIONER

## 2024-05-27 RX ORDER — FUROSEMIDE 20 MG
20 TABLET ORAL DAILY
Status: SHIPPED
Start: 2024-05-27

## 2024-05-27 RX ORDER — HEPARIN SODIUM,PORCINE 10 UNIT/ML
5-10 VIAL (ML) INTRAVENOUS EVERY 24 HOURS
Status: DISCONTINUED | OUTPATIENT
Start: 2024-05-27 | End: 2024-05-27 | Stop reason: HOSPADM

## 2024-05-27 RX ORDER — HEPARIN SODIUM (PORCINE) LOCK FLUSH IV SOLN 100 UNIT/ML 100 UNIT/ML
5-10 SOLUTION INTRAVENOUS
Status: DISCONTINUED | OUTPATIENT
Start: 2024-05-27 | End: 2024-05-27 | Stop reason: HOSPADM

## 2024-05-27 RX ORDER — HEPARIN SODIUM,PORCINE 10 UNIT/ML
5-10 VIAL (ML) INTRAVENOUS
Status: DISCONTINUED | OUTPATIENT
Start: 2024-05-27 | End: 2024-05-27 | Stop reason: HOSPADM

## 2024-05-27 RX ORDER — MAGNESIUM OXIDE 400 MG/1
400 TABLET ORAL EVERY 4 HOURS
Status: DISCONTINUED | OUTPATIENT
Start: 2024-05-27 | End: 2024-05-27 | Stop reason: HOSPADM

## 2024-05-27 RX ADMIN — APIXABAN 2.5 MG: 2.5 TABLET, FILM COATED ORAL at 08:42

## 2024-05-27 RX ADMIN — METOPROLOL SUCCINATE 50 MG: 50 TABLET, EXTENDED RELEASE ORAL at 08:43

## 2024-05-27 RX ADMIN — ATORVASTATIN CALCIUM 40 MG: 40 TABLET, FILM COATED ORAL at 08:43

## 2024-05-27 RX ADMIN — HEPARIN 5 ML: 100 SYRINGE at 15:04

## 2024-05-27 RX ADMIN — FLUTICASONE PROPIONATE 2 SPRAY: 50 SPRAY, METERED NASAL at 11:19

## 2024-05-27 RX ADMIN — PANTOPRAZOLE SODIUM 40 MG: 40 TABLET, DELAYED RELEASE ORAL at 08:42

## 2024-05-27 RX ADMIN — FLUTICASONE FUROATE AND VILANTEROL TRIFENATATE 1 PUFF: 200; 25 POWDER RESPIRATORY (INHALATION) at 08:55

## 2024-05-27 RX ADMIN — MAGNESIUM OXIDE TAB 400 MG (241.3 MG ELEMENTAL MG) 400 MG: 400 (241.3 MG) TAB at 13:59

## 2024-05-27 RX ADMIN — FAMOTIDINE 20 MG: 20 TABLET ORAL at 08:43

## 2024-05-27 RX ADMIN — CITALOPRAM HYDROBROMIDE 40 MG: 20 TABLET ORAL at 08:43

## 2024-05-27 RX ADMIN — PRENATAL VITAMINS-IRON FUMARATE 27 MG IRON-FOLIC ACID 0.8 MG TABLET 1 TABLET: at 08:42

## 2024-05-27 RX ADMIN — Medication 5 ML: at 11:10

## 2024-05-27 ASSESSMENT — ACTIVITIES OF DAILY LIVING (ADL)
ADLS_ACUITY_SCORE: 30
ADLS_ACUITY_SCORE: 23
ADLS_ACUITY_SCORE: 23
ADLS_ACUITY_SCORE: 30
ADLS_ACUITY_SCORE: 30
ADLS_ACUITY_SCORE: 23
ADLS_ACUITY_SCORE: 23
ADLS_ACUITY_SCORE: 30
ADLS_ACUITY_SCORE: 30
ADLS_ACUITY_SCORE: 23
ADLS_ACUITY_SCORE: 23
ADLS_ACUITY_SCORE: 30
ADLS_ACUITY_SCORE: 23
ADLS_ACUITY_SCORE: 30
ADLS_ACUITY_SCORE: 23

## 2024-05-27 NOTE — PLAN OF CARE
"Goal Outcome Evaluation:  Discharge Note    Patient discharged to home via private vehicle  accompanied by son.  Port : Heparinized and De-accessed   Prescriptions N/A.   Belongings reviewed and sent with patient.   Home medications returned to patient: NA  Equipment sent with: N/A.   patient and family verbalizes understanding of discharge instructions. AVS given to patient and family.  Additional education completed?  N/A      Plan of Care Reviewed With: patient    Overall Patient Progress: improvingOverall Patient Progress: improving    Outcome Evaluation: Discharging this afternoon      Problem: Adult Inpatient Plan of Care  Goal: Plan of Care Review  Description: The Plan of Care Review/Shift note should be completed every shift.  The Outcome Evaluation is a brief statement about your assessment that the patient is improving, declining, or no change.  This information will be displayed automatically on your shift  note.  5/27/2024 1537 by Leslie Syeks RN  Outcome: Met  5/27/2024 1424 by Leslie Sykes RN  Outcome: Progressing  5/27/2024 1424 by Leslie Sykes RN  Outcome: Progressing  Flowsheets (Taken 5/27/2024 1424)  Plan of Care Reviewed With: patient  Overall Patient Progress: improving  5/27/2024 1423 by Leslie Sykes RN  Outcome: Progressing  Flowsheets (Taken 5/27/2024 1423)  Outcome Evaluation: Discharging this afternoon  Goal: Patient-Specific Goal (Individualized)  Description: You can add care plan individualizations to a care plan. Examples of Individualization might be:  \"Parent requests to be called daily at 9am for status\", \"I have a hard time hearing out of my right ear\", or \"Do not touch me to wake me up as it startles  me\".  5/27/2024 1537 by Leslie Sykes RN  Outcome: Met  5/27/2024 1424 by Leslie Sykes RN  Outcome: Progressing  5/27/2024 1424 by Leslie Sykes RN  Outcome: Progressing  5/27/2024 1423 by Leslie Sykes RN  Outcome: Progressing  Goal: Absence of Hospital-Acquired Illness " or Injury  5/27/2024 1537 by Leslie Sykes RN  Outcome: Met  5/27/2024 1424 by Leslie Sykes RN  Outcome: Progressing  5/27/2024 1424 by Leslie Sykes RN  Outcome: Progressing  5/27/2024 1423 by Leslie Sykes RN  Outcome: Progressing  Intervention: Identify and Manage Fall Risk  Recent Flowsheet Documentation  Taken 5/27/2024 0845 by Leslie Sykes RN  Safety Promotion/Fall Prevention: activity supervised  Intervention: Prevent Skin Injury  Recent Flowsheet Documentation  Taken 5/27/2024 1100 by Leslie Sykes RN  Body Position: position changed independently  Intervention: Prevent and Manage VTE (Venous Thromboembolism) Risk  Recent Flowsheet Documentation  Taken 5/27/2024 0845 by Leslie Sykes RN  VTE Prevention/Management: compression stockings off  Intervention: Prevent Infection  Recent Flowsheet Documentation  Taken 5/27/2024 0845 by Leslie Sykes RN  Infection Prevention:   environmental surveillance performed   hand hygiene promoted  Goal: Optimal Comfort and Wellbeing  5/27/2024 1537 by Leslie Sykes RN  Outcome: Met  5/27/2024 1424 by Leslie Sykes RN  Outcome: Progressing  5/27/2024 1424 by Leslie Sykes RN  Outcome: Progressing  5/27/2024 1423 by Leslie Sykes RN  Outcome: Progressing  Intervention: Monitor Pain and Promote Comfort  Recent Flowsheet Documentation  Taken 5/27/2024 1100 by Leslie Sykes RN  Pain Management Interventions: declines  Goal: Readiness for Transition of Care  5/27/2024 1537 by Leslie Sykes RN  Outcome: Met  5/27/2024 1424 by Leslie Sykes RN  Outcome: Progressing  5/27/2024 1424 by Leslie Sykes, RN  Outcome: Progressing  5/27/2024 1423 by Leslie Sykes RN  Outcome: Progressing     Problem: Pain Acute  Goal: Optimal Pain Control and Function  5/27/2024 1537 by Leslie Sykes RN  Outcome: Met  5/27/2024 1424 by Leslie Sykes, RN  Outcome: Progressing  5/27/2024 1424 by Sykes, Leslie M, RN  Outcome: Progressing  5/27/2024 1423 by Leslie Sykes, RN  Outcome:  Progressing  Intervention: Develop Pain Management Plan  Recent Flowsheet Documentation  Taken 5/27/2024 1100 by Leslie Sykes RN  Pain Management Interventions: declines  Intervention: Prevent or Manage Pain  Recent Flowsheet Documentation  Taken 5/27/2024 0845 by Leslie Sykes RN  Bowel Elimination Promotion: ambulation promoted  Medication Review/Management: medications reviewed  Intervention: Optimize Psychosocial Wellbeing  Recent Flowsheet Documentation  Taken 5/27/2024 0845 by Leslie Sykes RN  Supportive Measures: active listening utilized     Problem: Fall Injury Risk  Goal: Absence of Fall and Fall-Related Injury  5/27/2024 1537 by Leslie Sykes, CHLOÉ  Outcome: Met  5/27/2024 1424 by Leslie Sykes, RN  Outcome: Progressing  5/27/2024 1424 by Leslie Sykes RN  Outcome: Progressing  5/27/2024 1423 by Leslie Sykes, RN  Outcome: Progressing  Intervention: Identify and Manage Contributors  Recent Flowsheet Documentation  Taken 5/27/2024 0845 by Leslie Sykse RN  Medication Review/Management: medications reviewed  Intervention: Promote Injury-Free Environment  Recent Flowsheet Documentation  Taken 5/27/2024 0845 by Leslie Sykes RN  Safety Promotion/Fall Prevention: activity supervised     Problem: Fatigue  Goal: Improved Activity Tolerance  5/27/2024 1537 by Leslie Sykes RN  Outcome: Met  5/27/2024 1424 by Leslie Sykes, RN  Outcome: Progressing  5/27/2024 1424 by Leslie Sykes, RN  Outcome: Progressing  5/27/2024 1423 by Leslie Sykes, RN  Outcome: Progressing  Intervention: Promote Improved Energy  Recent Flowsheet Documentation  Taken 5/27/2024 1100 by Leslie Sykes, CHLOÉ  Activity Management:   activity adjusted per tolerance   activity encouraged   activity minimized  Taken 5/27/2024 0845 by Leslie Sykes, CHLOÉ  Environmental Support: calm environment promoted     Problem: Chemotherapy Effects  Goal: Anemia Symptom Improvement  5/27/2024 1537 by Leslie Sykes, CHLOÉ  Outcome: Met  5/27/2024 1424 by Onel  Leslie BRADLEY RN  Outcome: Progressing  5/27/2024 1424 by Leslie Sykes RN  Outcome: Progressing  5/27/2024 1423 by Leslie Sykes RN  Outcome: Progressing  Intervention: Monitor and Manage Anemia  Recent Flowsheet Documentation  Taken 5/27/2024 0845 by Leslie Sykes RN  Safety Promotion/Fall Prevention: activity supervised  Goal: Safety Maintained  5/27/2024 1537 by Leslie Sykes RN  Outcome: Met  5/27/2024 1424 by Leslie Sykes RN  Outcome: Progressing  5/27/2024 1424 by Leslie Sykes RN  Outcome: Progressing  5/27/2024 1423 by Leslie Sykes RN  Outcome: Progressing  Intervention: Promote Safe Chemotherapy Delivery  Recent Flowsheet Documentation  Taken 5/27/2024 0845 by Leslie Sykes RN  Infection Prevention:   environmental surveillance performed   hand hygiene promoted  Chemotherapy Environmental Safety: protective environment maintained  Goal: Absence of Hematuria  5/27/2024 1537 by Leslie Sykes RN  Outcome: Met  5/27/2024 1424 by Leslie Sykes, RN  Outcome: Progressing  5/27/2024 1424 by Leslie Sykes, RN  Outcome: Progressing  5/27/2024 1423 by Leslie Sykes RN  Outcome: Progressing  Intervention: Prevent or Manage Hemorrhagic Cystitis  Recent Flowsheet Documentation  Taken 5/27/2024 1100 by Leslie Sykes RN  Pain Management Interventions: declines  Goal: Nausea and Vomiting Relief  5/27/2024 1537 by Leslie Sykes RN  Outcome: Met  5/27/2024 1424 by Leslie Sykes, RN  Outcome: Progressing  5/27/2024 1424 by Leslie Sykes, RN  Outcome: Progressing  5/27/2024 1423 by Leslie Sykes, RN  Outcome: Progressing  Intervention: Prevent or Manage Nausea and Vomiting  Recent Flowsheet Documentation  Taken 5/27/2024 0845 by Leslie Sykes RN  Environmental Support: calm environment promoted  Goal: Neurotoxicity Symptom Control  5/27/2024 1537 by Leslie Sykes, CHLOÉ  Outcome: Met  5/27/2024 1424 by Leslie Sykes, RN  Outcome: Progressing  5/27/2024 1424 by Leslie Sykes RN  Outcome: Progressing  5/27/2024 1423 by  Leslie Sykes, RN  Outcome: Progressing  Goal: Absence of Infection  5/27/2024 1537 by Leslie Sykes RN  Outcome: Met  5/27/2024 1424 by Leslie Sykes RN  Outcome: Progressing  5/27/2024 1424 by Leslie Sykes RN  Outcome: Progressing  5/27/2024 1423 by Leslie Sykes RN  Outcome: Progressing  Intervention: Prevent Infection and Maximize Resistance  Recent Flowsheet Documentation  Taken 5/27/2024 0845 by Leslie Sykes RN  Infection Prevention:   environmental surveillance performed   hand hygiene promoted  Goal: Absence of Bleeding  5/27/2024 1537 by Leslie Sykes RN  Outcome: Met  5/27/2024 1424 by Leslie Sykes RN  Outcome: Progressing  5/27/2024 1424 by Leslie Sykes RN  Outcome: Progressing  5/27/2024 1423 by Leslie Sykes RN  Outcome: Progressing

## 2024-05-27 NOTE — DISCHARGE SUMMARY
Discharge Summary  Hospitalist Service    Andie Biswas MRN# [id number]   YOB: 1940 Age: 84 year old     Date of Admission:  5/26/2024  Date of Discharge:  5/27/2024  Admitting Physician:  Darlene Page DO  Discharge Physician: Susana Lovelace MD  Discharging Service: Hospitalist Service     Primary Provider: No Ref-Primary, Physician  Primary Care Physician Phone Number: None         Discharge Diagnoses/Problem Oriented Hospital Course (Providers):      Andie Biswas was admitted on 5/26/2024 by Darlene Page DO and I would refer you to their history and physical.  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  This began after her 6th round of chemo.       Notably, the patient denies experiencing chest pain, fever, cough, diarrhea, bloody/black stool, changes in fluid or food intake, or difficulty urinating. She used to have diarrhea but last couple of days she had formed, soft, and brown stools.      ER VSS and she is afebrile   CMP with stable CKD  Trops flat 20->20  CBC with pancytopenia without neutrophilia   UA negative      Covid/flu/rsv negative  CXR negative      Problem List:   Malaise/weakness/SOB   She states that she gets a little bit weaker and shakier after each session.  She's now completed all of her chemo and XRT.  She is feeling better this morning and ready to go home.  I offered to have PT see her but she states she has a cane/walker that she uses when she needs to, and she has exercises from prior PT evaluations. I suspect all related to SE from chemo/XRT    Pancytopenia   No e/o neutropenia  Almost certainly related to chemo effect  No indication for GCSF or transfusion at this point      Htn, hlp  CAD with ischemic CM most recent EF 40% in 7/2023.  Has biV pacer and ICD  PAF chronically on apixaban   PAD  Cont pta apixaban for stroke ppx in setting of PAF.  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   Follows with Kimber BEDOLLA oncologist at [facility]  S/p 6 weekly treatments of carboplatin/placitaxel with concurrent XRT 5/23/2024  Not clear how many are needed and how long they plan on going with XRT as not well documented in the chart  Looks to have follow-up PET scan 6/24/2024     Depression  Cont with pta citalopram  GERD  Cont with pta omeprazole         DVT Prophylaxis: DOAC           Code Status:      Full Code        Brief Hospital Stay Summary Sent Home With Patient in AVS:          Reason for your hospital stay      Generalized weakness                     Important Results:        As noted below          Pending Results:        Unresulted Labs Ordered in the Past 30 Days of this Admission       Date and Time Order Name Status Description    5/26/2024  7:51  PM Blood Culture Arm, Left Preliminary     5/26/2024  5:31 PM Blood Culture Peripheral Blood Preliminary               Discharge Instructions and Follow-Up:      Follow-up Appointments     Follow-up and recommended labs and tests       Follow-up with oncology as previously scheduled              Discharge Disposition:        Discharged to home         Discharge Medications:        Current Discharge Medication List        CONTINUE these medications which have CHANGED    Details   furosemide (LASIX) 20 MG tablet Take 1 tablet (20 mg) by mouth daily Hold this medication when having poor po intake    Associated Diagnoses: General weakness; ZENG (dyspnea on exertion)           CONTINUE these medications which have NOT CHANGED    Details   albuterol (PROAIR HFA/PROVENTIL HFA/VENTOLIN HFA) 108 (90 Base) MCG/ACT inhaler Inhale 1-2 puffs into the lungs every 4 hours as needed for shortness of breath, wheezing or cough      apixaban ANTICOAGULANT (ELIQUIS) 2.5 MG tablet Take 2.5 mg by mouth 2 times daily      atorvastatin (LIPITOR) 40 MG tablet Take 40 mg by mouth daily      cetirizine (ZYRTEC) 10 MG tablet Take 10 mg by mouth daily as needed for allergies      citalopram (CELEXA) 40 MG tablet Take 40 mg by mouth daily      famotidine (PEPCID) 20 MG tablet Take 20 mg by mouth 2 times daily      fluticasone (FLONASE) 50 MCG/ACT nasal spray Spray 2 sprays into both nostrils daily      fluticasone-vilanterol (BREO ELLIPTA) 200-25 MCG/ACT inhaler Inhale 1 puff into the lungs daily      metoprolol succinate ER (TOPROL XL) 50 MG 24 hr tablet Take 50 mg by mouth daily      Omega-3 Fatty Acids (FISH OIL) 1200 MG capsule Take 1,200 mg by mouth daily      omeprazole (PRILOSEC OTC) 20 MG EC tablet Take 20 mg by mouth daily      ondansetron (ZOFRAN) 8 MG tablet Take 8 mg by mouth every 8 hours as needed for nausea      Prenatal Vit-Fe Fumarate-FA (PRENATAL MULTIVITAMIN  PLUS IRON) 27-1 MG TABS Take 1 tablet by mouth daily     "  prochlorperazine (COMPAZINE) 10 MG tablet Take 10 mg by mouth every 6 hours as needed for nausea or vomiting      spironolactone (ALDACTONE) 25 MG tablet Take 25 mg by mouth daily               Allergies:       No Known Allergies        Consultations This Hospital Stay:        None          Condition and Physical on Discharge:        Discharge condition: Stable   Vitals: Blood pressure 129/57, pulse 65, temperature 98.4  F (36.9  C), temperature source Oral, resp. rate 16, height 1.575 m (5' 2\"), weight 70.5 kg (155 lb 8 oz), SpO2 93%.     Constitutional: Pleasant nad looks stated age head nc/at sclera clear     Lungs: Normal respiratory effort   Cardiovascular: Normal CV status   Abdomen: Tolerating po   Skin: Warm and dry no cyanosis or clubbing    Other: Alert and oriented affect appropriate [place]          Discharge Time:      Greater than 30 minutes.        Image Results From This Hospital Stay (For Non-EPIC Providers):        Results for orders placed or performed during the hospital encounter of 24   XR Chest 2 Views    Narrative    EXAM: XR CHEST 2 VIEWS  LOCATION: [facility]  DATE: 2024    INDICATION: sob with exertion  COMPARISON: None.      Impression    IMPRESSION: Cardiac pacemaker/ICD in place. Right-sided Port-A-Cath remains in the distal SVC. Heart size is normal. Dense consolidations in the aortic arch. No CHF, lobar consolidation or effusion. No pneumothorax.   Echocardiogram Complete     Value    LVEF  40-45%    Narrative    [id number]  [id number]  [id number]  [id number]^CASEY^REGIS^[facility]  Echocardiography Laboratory  [address] [place] [address]     Name: VENU RAMIREZ  MRN: [id number]  : 1940  Study Date: 2024 09:08 AM  Age: 84 yrs  Gender: Female  Patient Location: [facility]  Reason For Study: Dyspnea  Ordering Physician: REGIS PEÑA  Performed By: Didi Camp RDCS     BSA: 1.7 " m2  Height: 61 in  Weight: 153 lb  HR: 59  BP: 153/54 mmHg  ______________________________________________________________________________  Procedure  Complete Portable Echo Adult. Technically difficult study.  ______________________________________________________________________________  Interpretation Summary     The visual ejection fraction is 40-45%. Septal motion is consistent with  conduction abnormality.  There is mild-moderate global hypokinesia of the left ventricle worse in the  inferior wall of the LV/  The right ventricle is normal in size and function. There is a pacemaker lead  in the right ventricle.  The inferior vena cava was normal in size with preserved respiratory  variability.  There is no pericardial effusion.     Prior report from care-everywhere suggests similar EF at 40%.  ______________________________________________________________________________  Left Ventricle  The left ventricle is normal in size. The visual ejection fraction is 40-45%.  Diastolic Doppler findings (E/E' ratio and/or other parameters) suggest left  ventricular filling pressures are normal. Septal motion is consistent with  conduction abnormality. There is mild-moderate global hypokinesia of the left  ventricle.     Right Ventricle  The right ventricle is normal in size and function. There is a pacemaker lead  in the right ventricle.     Atria  The left atrium is mildly dilated. Right atrial size is normal. Pacer wire in  right atrium.     Mitral Valve  There is mild mitral annular calcification. There is trace to mild mitral  regurgitation. There is no mitral valve stenosis. The mean mitral valve  gradient is 1.9 mmHg.     Tricuspid Valve  The tricuspid valve is not well visualized, but is grossly normal. The right  ventricular systolic pressure is approximated at 20.4 mmHg plus the right  atrial pressure. There is trace tricuspid regurgitation.     Aortic Valve  The aortic valve is trileaflet with aortic valve  sclerosis. No aortic  regurgitation is present. No hemodynamically significant valvular aortic  stenosis.     Pulmonic Valve  The pulmonic valve is not well visualized.     Vessels  The aortic root is normal size. The inferior vena cava was normal in size with  preserved respiratory variability.     Pericardium  There is no pericardial effusion.     ______________________________________________________________________________  MMode/2D Measurements & Calculations  IVSd: 0.73 cm  LVIDd: 5.3 cm  LVIDs: 3.3 cm  LVPWd: 1.2 cm  IVC diam: 1.8 cm  FS: 37.4 %     LV mass(C)d: 186.4 grams  LV mass(C)dI: 110.6 grams/m2  Ao root diam: 3.2 cm  Ao root diam index Ht(cm/m): 2.1  Ao root diam index BSA (cm/m2): 1.9  EF Biplane: 37.5 %  LA Volume (BP): 61.3 ml  LA Volume Index (BP): 36.3 ml/m2  RV Base: 2.4 cm  RWT: 0.43  TAPSE: 1.6 cm     Doppler Measurements & Calculations  MV E max jairo: 52.5 cm/sec  MV A max jairo: 88.3 cm/sec  MV E/A: 0.59  MV max P.4 mmHg  MV mean P.9 mmHg  MV V2 VTI: 19.9 cm  MV dec time: 0.17 sec  PA acc time: 0.07 sec  TR max jairo: 225.9 cm/sec  TR max P.4 mmHg  E/E' av.2  Lateral E/e': 6.4  Medial E/e': 8.0  RV S Jairo: 12.9 cm/sec     ______________________________________________________________________________  Report approved by: Zach Edmondson 2024 01:49 PM                 Most Recent Lab Results In EPIC (For Non-EPIC Providers):    Most Recent 3 CBC's:  Recent Labs   Lab Test 24  0612 24  1425   WBC 2.6* 2.8*   HGB 9.5* 9.2*   * 102*   * 116*      Most Recent 3 BMP's:  Recent Labs   Lab Test 24  0612 24  1425 24  1224    138  --    POTASSIUM 4.4 4.6  --    CHLORIDE 102 102  --    CO2 24 26  --    BUN 22.0 23.1*  --    CR 1.31* 1.51* 1.7*   ANIONGAP 13 10  --    WILL 8.8 9.1  --    GLC 99 103*  --      Most Recent 2 LFT's:  Recent Labs   Lab Test 24  0612 24  1425   AST 17 15   ALT 20 19   ALKPHOS 79 78   BILITOTAL 0.6  0.3     Most Recent TSH, T4 and HgbA1c:   Recent Labs   Lab Test 05/27/24  0612   TSH 1.24   A1C 6.5*

## 2024-05-27 NOTE — PHARMACY-ADMISSION MEDICATION HISTORY
Pharmacist Admission Medication History    Admission medication history is complete. The information provided in this note is only as accurate as the sources available at the time of the update.    Information Source(s): Patient, Family member, and CareEverywhere/SureScripts via phone    Pertinent Information: Andie told me to contact her daughter-in-law, Neha, who was able to look at the medication list at home and confirm all medications. Neha said the morning medications were taken today from the pill box. Andie confirmed she only takes metoprolol ER 50 mg one tablet daily despite being prescribed as one & one-half (75 mg) daily.    Andie was told to pause her metformin  mg one tablet once daily and Entresto 24-26 mg one tablet twice daily during chemotherapy. She has not restarted the medications therefore I did not add to her PTA list.    Changes made to PTA medication list:  Added: All  Deleted: None  Changed: None    Allergies reviewed with patient and updates made in EHR: yes    Medication History Completed By: Joe Rossi [facility] 5/26/2024 7:57 PM    PTA Med List   Medication Sig Last Dose    albuterol (PROAIR HFA/PROVENTIL HFA/VENTOLIN HFA) 108 (90 Base) MCG/ACT inhaler Inhale 1-2 puffs into the lungs every 4 hours as needed for shortness of breath, wheezing or cough Past Week    apixaban ANTICOAGULANT (ELIQUIS) 2.5 MG tablet Take 2.5 mg by mouth 2 times daily 5/26/2024 at AM    atorvastatin (LIPITOR) 40 MG tablet Take 40 mg by mouth daily 5/26/2024 at AM    cetirizine (ZYRTEC) 10 MG tablet Take 10 mg by mouth daily as needed for allergies Past Month    citalopram (CELEXA) 40 MG tablet Take 40 mg by mouth daily 5/26/2024 at AM    famotidine (PEPCID) 20 MG tablet Take 20 mg by mouth 2 times daily 5/26/2024 at AM    fluticasone (FLONASE) 50 MCG/ACT nasal spray Spray 2 sprays into both nostrils daily Past Week    fluticasone-vilanterol (BREO ELLIPTA) 200-25 MCG/ACT inhaler Inhale 1 puff into  the lungs daily Past Week    furosemide (LASIX) 20 MG tablet Take 20 mg by mouth daily Past Week    metoprolol succinate ER (TOPROL XL) 50 MG 24 hr tablet Take 50 mg by mouth daily 5/26/2024 at AM    Omega-3 Fatty Acids (FISH OIL) 1200 MG capsule Take 1,200 mg by mouth daily 5/26/2024 at AM    omeprazole (PRILOSEC OTC) 20 MG EC tablet Take 20 mg by mouth daily 5/26/2024 at AM    ondansetron (ZOFRAN) 8 MG tablet Take 8 mg by mouth every 8 hours as needed for nausea 5/26/2024    Prenatal Vit-Fe Fumarate-FA (PRENATAL MULTIVITAMIN  PLUS IRON) 27-1 MG TABS Take 1 tablet by mouth daily 5/26/2024 at AM    prochlorperazine (COMPAZINE) 10 MG tablet Take 10 mg by mouth every 6 hours as needed for nausea or vomiting 5/26/2024    spironolactone (ALDACTONE) 25 MG tablet Take 25 mg by mouth daily Past Week

## 2024-05-27 NOTE — PLAN OF CARE
"End of Shift Summary  For vital signs and complete assessments, please see documentation flowsheets.     Pertinent assessments: Alert and oriented x4,  VSS, afebrile, room air, up SBA with IV pole. denies pain/nausea, Tele monitoring in place. PORT infusing LR @75ml/hr. Bed alarm on for safety. Protective isolation precautions maintained.      Major Shift Events melatonin    Treatment Plan: Monitor labs,tele monitoring, IVF, Echo today  Bedside Nurse: Ileana Syed RN               Problem: Adult Inpatient Plan of Care  Goal: Plan of Care Review  Description: The Plan of Care Review/Shift note should be completed every shift.  The Outcome Evaluation is a brief statement about your assessment that the patient is improving, declining, or no change.  This information will be displayed automatically on your shift  note.  Outcome: Not Progressing  Flowsheets (Taken 5/27/2024 0649)  Outcome Evaluation: denies pain/nausea, ZENG, weakess  Plan of Care Reviewed With: patient  Overall Patient Progress: no change  Goal: Patient-Specific Goal (Individualized)  Description: You can add care plan individualizations to a care plan. Examples of Individualization might be:  \"Parent requests to be called daily at 9am for status\", \"I have a hard time hearing out of my right ear\", or \"Do not touch me to wake me up as it startles  me\".  Outcome: Not Progressing  Goal: Absence of Hospital-Acquired Illness or Injury  Outcome: Not Progressing  Intervention: Identify and Manage Fall Risk  Recent Flowsheet Documentation  Taken 5/27/2024 0001 by Ileana Syed RN  Safety Promotion/Fall Prevention:   activity supervised   clutter free environment maintained   increased rounding and observation   lighting adjusted   nonskid shoes/slippers when out of bed   mobility aid in reach   room near nurse's station  Intervention: Prevent Skin Injury  Recent Flowsheet Documentation  Taken 5/27/2024 0001 by Ileana Syed RN  Body Position: position " changed independently  Skin Protection: adhesive use limited  Device Skin Pressure Protection:   absorbent pad utilized/changed   adhesive use limited   tubing/devices free from skin contact  Intervention: Prevent and Manage VTE (Venous Thromboembolism) Risk  Recent Flowsheet Documentation  Taken 5/27/2024 0001 by Ileana Syed RN  VTE Prevention/Management: SCDs (sequential compression devices) off  Intervention: Prevent Infection  Recent Flowsheet Documentation  Taken 5/27/2024 0001 by Ileana Syed RN  Infection Prevention:   cohorting utilized   rest/sleep promoted  Goal: Optimal Comfort and Wellbeing  Outcome: Not Progressing  Goal: Readiness for Transition of Care  Outcome: Not Progressing   Goal Outcome Evaluation:      Plan of Care Reviewed With: patient    Overall Patient Progress: no changeOverall Patient Progress: no change    Outcome Evaluation: denies pain/nausea, ZENG, weakess

## 2024-05-27 NOTE — H&P
"[facility]    History and Physical - Hospitalist Service       Date of Admission:  5/26/2024    Assessment & Plan   Andiedash Biswas is a 84-year-old female with a medical history of stage III adenocarcinoma of the lung s/p chemoradiation (carboplatin/paclitaxel with radiation, last chemotherapy was on 5/23/24), COPD, asthma, paroxysmal atrial fibrillation (on Eliquis), coronary artery disease, ischemic cardiomyopathy s/p biventricular implantable cardioverter-defibrillator (ICD) placement, chronic systolic heart failure (reduced LVEF of 40% from echo on 7/11/23), hypertension, hyperlipidemia, impaired glucose tolerance, CKD stage IIIb, peripheral arterial disease, GERD, osteoporosis, former smoker, and depression who presented to the [facility] Emergency department for the evaluation of worsening generalized weakness, and shortness of breath with activity.     #. Acute on chronic generalized weakness   #. Shortness of breath on exertion   #. Nausea without emesis   #. Hypomagnesemia   #. Pancytopenia  The patient reports feeling \"wiped out and unable to do simple tasks\" with associated shortness of breath with most activities.  The patient also complains lower abdominal pain that she describes as being gas-like pain.  No diarrhea, but she has nausea w/o vomiting.  Troponin elevated at 20, but delta unchanged.  Heart failure appears to be stable, proBNP 604.  EKG with atrial pacing, and no signs of ischemic changes.  CBC with leukopenia (WBC 2.8), macrocytic anemia (hgb 9.2 with MCV of 102 and RDW of 17.6), and thrombocytopenia (plts 116) in the setting of recent chemoradiation therapy for lung cancer.  Procalcitonin is negative, 0.04.  UA was negative for infection.  COVID, influenza, and RSV came back negative.  Chest x-ray shows no signs of pneumonia.  It is unclear if the patient is having generalized weakness secondary to chemotherapy versus cardiac related issues with the " chemo use/extensive cardiac history.  Overall plan is to admit patient to the medicine team for monitoring and further evaluation.  -Cardiac monitoring  -Zofran for nausea  -Simethicone for bloating symptom management    -LR at 75ml/hr for total of 1 L for hydration  -Echocardiogram in the morning  -Morning labs: CBC, CMP, Mg, TSH with free T4, and hemoglobin A1c    #. Paroxysmal atrial fibrillation  #. Coronary artery disease  #. Ischemic cardiomyopathy s/p biventricular implantable cardioverter-defibrillator placement  #. Chronic systolic heart failure   #. Hypertension  #. Hyperlipidemia  Last echocardiogram from 7/11/23 showed reduced LVEF of 40%.   -Continue PTA Apixaban, Atorvastatin, and Metoprolol  -Spironolactone and Lasix have been on hold since last week    #. Stage III adenocarcinoma of the lung s/p chemoradiation  The patient is s/p carboplatin/paclitaxel with radiation, last infusion was on 5/23/24. The pt follows GRAEME Lynne oncologist at [facility]. She is scheduled PET scan week of 6/24/24, and toxicity check on 5/28/24.     #. COPD  #. Asthma   #. Seasonal allergies  -Continue PTA Flonase, Zyrtec, and Breo Ellipta  -DuoNeb as needed for shortness of breath and wheezing    #. CKD stage IIIb  Creatinine is 1.51, which is baseline.  BUN slightly elevated, 23.1.  Other electrolytes are normal.  -Monitor creatinine and electrolytes  -Avoid nephrotoxic medications     #. GERD  -Continue PTA Omeprazole    #. Depression   -Continue PTA Citalopram     Diet: Advance Diet as Tolerated: Regular Diet Adult    DVT Prophylaxis: Pneumatic Compression Devices. On Apixaban for Atrial Fibrillation.   Pereyra Catheter: Not present  Lines: PRESENT        Cardiac Monitoring: ACTIVE order. Indication: Shortrness of breatgh and generalized weakness  Code Status: Full Code      Clinically Significant Risk Factors Present on Admission            # Hypomagnesemia: Lowest Mg = 1.5 mg/dL in last 2 days, will replace  as needed    # Drug Induced Coagulation Defect: home medication list includes an anticoagulant medication  # Thrombocytopenia: Lowest platelets = 116 in last 2 days, will monitor for bleeding                   Disposition Plan     Medically Ready for Discharge: Anticipated in 2-4 Days     The patient's care was discussed with the Attending Physician, MIKY Solorio [facility]  Hospitalist Service  [facility]  Securely message with [email or web address] (more info)  Text page via [facility] Paging/Directory     ______________________________________________________________________    Chief Complaint   Worsening generalized weakness, and shortness of breath with activity.     History is obtained from the patient    History of Present Illness   Andie Biswas is a 84-year-old female with a medical history of stage III adenocarcinoma of the lung s/p chemoradiation (carboplatin/paclitaxel with radiation, last chemotherapy was on), COPD, asthma, paroxysmal atrial fibrillation (on Eliquis), coronary artery disease, ischemic cardiomyopathy s/p biventricular implantable cardioverter-defibrillator (ICD) placement, chronic systolic heart failure (reduced LVEF of 40% from echo on 7/11/23), hypertension, hyperlipidemia, impaired glucose tolerance, CKD stage IIIb, peripheral arterial disease, GERD, osteoporosis, and depression who presented to the [facility] Emergency department for the evaluation of worsening generalized weakness, and shortness of breath with activity.     The symptoms began on Thursday, May 23, 2024, after the patient's latest round of chemotherapy. She is currently undergoing daily radiation treatment for six weeks along with weekly chemotherapy sessions. Presently, she experiences symptoms including nausea without vomiting, lower quadrant abdominal pain, body aches, shakiness, weakness, and shortness of breath. Despite taking prescribed medications for nausea, relief has  been minimal. Notably, the patient denies experiencing chest pain, fever, cough, diarrhea, bloody/black stool, changes in fluid or food intake, or difficulty urinating. She used to have diarrhea but last couple of days she had formed, soft, and brown stools. There have been no recent alterations to her medication regimen. Additionally, the patient has a pacemaker and a chest port, with no prior history of similar symptoms following cancer treatments. She is under the care of [facility] oncology.    Past Medical History    No past medical history on file.    Past Surgical History   Past Surgical History:   Procedure Laterality Date    IR CHEST PORT PLACEMENT > 5 YRS OF AGE  4/9/2024     Prior to Admission Medications   Prior to Admission Medications   Prescriptions Last Dose Informant Patient Reported? Taking?   Omega-3 Fatty Acids (FISH OIL) 1200 MG capsule 5/26/2024 at AM Other Yes Yes   Sig: Take 1,200 mg by mouth daily   Prenatal Vit-Fe Fumarate-FA (PRENATAL MULTIVITAMIN  PLUS IRON) 27-1 MG TABS 5/26/2024 at AM Other Yes Yes   Sig: Take 1 tablet by mouth daily   albuterol (PROAIR HFA/PROVENTIL HFA/VENTOLIN HFA) 108 (90 Base) MCG/ACT inhaler Past Week Other Yes Yes   Sig: Inhale 1-2 puffs into the lungs every 4 hours as needed for shortness of breath, wheezing or cough   apixaban ANTICOAGULANT (ELIQUIS) 2.5 MG tablet 5/26/2024 at AM Other Yes Yes   Sig: Take 2.5 mg by mouth 2 times daily   atorvastatin (LIPITOR) 40 MG tablet 5/26/2024 at AM Other Yes Yes   Sig: Take 40 mg by mouth daily   cetirizine (ZYRTEC) 10 MG tablet Past Month Other Yes Yes   Sig: Take 10 mg by mouth daily as needed for allergies   citalopram (CELEXA) 40 MG tablet 5/26/2024 at AM Other Yes Yes   Sig: Take 40 mg by mouth daily   famotidine (PEPCID) 20 MG tablet 5/26/2024 at AM Other Yes Yes   Sig: Take 20 mg by mouth 2 times daily   fluticasone (FLONASE) 50 MCG/ACT nasal spray Past Week Other Yes Yes   Sig: Spray 2 sprays into both nostrils  daily   fluticasone-vilanterol (BREO ELLIPTA) 200-25 MCG/ACT inhaler Past Week Other Yes Yes   Sig: Inhale 1 puff into the lungs daily   furosemide (LASIX) 20 MG tablet Past Week Other Yes Yes   Sig: Take 20 mg by mouth daily   metoprolol succinate ER (TOPROL XL) 50 MG 24 hr tablet 5/26/2024 at AM Other Yes Yes   Sig: Take 50 mg by mouth daily   omeprazole (PRILOSEC OTC) 20 MG EC tablet 5/26/2024 at AM Other Yes Yes   Sig: Take 20 mg by mouth daily   ondansetron (ZOFRAN) 8 MG tablet 5/26/2024 Other Yes Yes   Sig: Take 8 mg by mouth every 8 hours as needed for nausea   prochlorperazine (COMPAZINE) 10 MG tablet 5/26/2024 Other Yes Yes   Sig: Take 10 mg by mouth every 6 hours as needed for nausea or vomiting   spironolactone (ALDACTONE) 25 MG tablet Past Week Other Yes Yes   Sig: Take 25 mg by mouth daily      Facility-Administered Medications: None        Review of Systems    The 10 point Review of Systems is negative other than noted in the HPI or here.     Physical Exam   Vital Signs: Temp: 97.5  F (36.4  C) Temp src: Oral BP: (!) 148/65 Pulse: 60   Resp: 16 SpO2: 95 %      Weight: 0 lbs 0 oz    Constitutional:  She is not in acute distress.She is ill-appearing. She is not toxic-appearing.   HENT: Atraumatic. Neck supple. Mucous membranes are moist.   Eyes:  Conjunctivae normal.   Cardiovascular: Normal rate and regular rhythm.  No murmur heard.+ Pacer/AICD. + Port in right chest  Pulmonary: Pulmonary effort is normal. No respiratory distress. Normal breath sounds. No wheezing, rhonchi or rales.   Abdominal:  Abdomen is flat. Bowel sounds are normal. Soft. There is no distension.There is no abdominal tenderness. There is no guarding or rebound.   Musculoskeletal: No deformity or signs of injury. Moves all extremities spontaneously.    Skin: Skin is warm and dry. No rash.   Neurological: She is alert and oriented to person, place, and time. No cranial nerve deficit. No sensory deficit. No focal weakness.    Psychiatric: Behavior normal.     Medical Decision Making       60 MINUTES SPENT BY ME on the date of service doing chart review, history, exam, documentation & further activities per the note.      Data   ------------------------- PAST 24 HR DATA REVIEWED -----------------------------------------------    I have personally reviewed the following data over the past 24 hrs:    2.8 (L)  \   9.2 (L)   / 116 (L)     138 102 23.1 (H) /  103 (H)   4.6 26 1.51 (H) \     ALT: 19 AST: 15 AP: 78 TBILI: 0.3   ALB: 3.5 TOT PROTEIN: 6.1 (L) LIPASE: N/A     Trop: 20 (H) BNP: 606     Procal: 0.04 CRP: N/A Lactic Acid: N/A       INR:  N/A PTT:  N/A   D-dimer:  0.33 Fibrinogen:  N/A       Imaging results reviewed over the past 24 hrs:   Recent Results (from the past 24 hour(s))   XR Chest 2 Views    Narrative    EXAM: XR CHEST 2 VIEWS  LOCATION: [facility]  DATE: 5/26/2024    INDICATION: sob with exertion  COMPARISON: None.      Impression    IMPRESSION: Cardiac pacemaker/ICD in place. Right-sided Port-A-Cath remains in the distal SVC. Heart size is normal. Dense consolidations in the aortic arch. No CHF, lobar consolidation or effusion. No pneumothorax.

## 2024-05-27 NOTE — PROGRESS NOTES
Went to 5th floor for peripheral access for patient needing an IV ultrasound.  Pt tolerated ultrasound IV and meeta blood from site per charge nurse request.  Family at bedside wondering/inquiring if IV access was necessary and concerned about the swelling in patient's right arm which the previous IV was located.  Charge nurse and primary nurse speaking with family member about her concerns.  Family member taking pictures of arm and on her cellular phone.

## 2024-05-27 NOTE — PLAN OF CARE
Goal Outcome Evaluation:  Pertinent assessments: A&Ox4. VSS. On RA. Denied pain or discomfort. SBA. Refuses safety alarms on. Charge nurse & MD notified. Protective not need per MD. Port hep locked. Discharging this afternoon.

## 2024-05-27 NOTE — PLAN OF CARE
"To Do:  End of Shift Summary  For vital signs and complete assessments, please see documentation flowsheets.     Pertinent assessments: A&Ox4, VSS on RA. Afebrile. SBA with IV pole. Regular diet. Rates pain in abdomen at 4, tylenol given. Denies nausea, SOB. Tele monitoring in place. PORT infusing LR @75ml/hr. Eliquis given. Bed alarm on for safety. Protective precautions maintained.      Major Shift Events Pt admitted to unit.     Treatment Plan: Monitor fatigue/dizziness, tele monitoring, IVF, Echo in am.   Bedside Nurse: Dolores Mathias RN   Problem: Adult Inpatient Plan of Care  Goal: Plan of Care Review  Description: The Plan of Care Review/Shift note should be completed every shift.  The Outcome Evaluation is a brief statement about your assessment that the patient is improving, declining, or no change.  This information will be displayed automatically on your shift  note.  Outcome: Progressing  Flowsheets (Taken 5/26/2024 3402)  Outcome Evaluation: Pain controlled with tylenol. PORT infusing. Bed alarm on.  Plan of Care Reviewed With: patient  Overall Patient Progress: improving  Goal: Patient-Specific Goal (Individualized)  Description: You can add care plan individualizations to a care plan. Examples of Individualization might be:  \"Parent requests to be called daily at 9am for status\", \"I have a hard time hearing out of my right ear\", or \"Do not touch me to wake me up as it startles  me\".  Outcome: Progressing  Goal: Absence of Hospital-Acquired Illness or Injury  Outcome: Progressing  Intervention: Identify and Manage Fall Risk  Recent Flowsheet Documentation  Taken 5/26/2024 2126 by Dolores Mathias, RN  Safety Promotion/Fall Prevention:   activity supervised   clutter free environment maintained   increased rounding and observation   lighting adjusted   nonskid shoes/slippers when out of bed   mobility aid in reach   room near nurse's station  Intervention: Prevent and Manage VTE (Venous Thromboembolism) " Risk  Recent Flowsheet Documentation  Taken 5/26/2024 2126 by Dolores Mathias RN  VTE Prevention/Management: SCDs (sequential compression devices) off  Intervention: Prevent Infection  Recent Flowsheet Documentation  Taken 5/26/2024 2126 by Dolores Mathias RN  Infection Prevention:   cohorting utilized   rest/sleep promoted  Goal: Optimal Comfort and Wellbeing  Outcome: Progressing  Intervention: Monitor Pain and Promote Comfort  Recent Flowsheet Documentation  Taken 5/26/2024 2200 by Dolores Mathias RN  Pain Management Interventions: medication (see MAR)  Taken 5/26/2024 2126 by Dolores Mathias RN  Pain Management Interventions: declines  Goal: Readiness for Transition of Care  Outcome: Progressing  Intervention: Mutually Develop Transition Plan  Recent Flowsheet Documentation  Taken 5/26/2024 2128 by Dolores Mathias RN  Equipment Currently Used at Home: none     Problem: Pain Acute  Goal: Optimal Pain Control and Function  Outcome: Progressing  Intervention: Develop Pain Management Plan  Recent Flowsheet Documentation  Taken 5/26/2024 2200 by Dolores Mathias RN  Pain Management Interventions: medication (see MAR)  Taken 5/26/2024 2126 by Dolores Mathias RN  Pain Management Interventions: declines     Problem: Fall Injury Risk  Goal: Absence of Fall and Fall-Related Injury  Outcome: Progressing  Intervention: Promote Injury-Free Environment  Recent Flowsheet Documentation  Taken 5/26/2024 2126 by Dolores Mathias RN  Safety Promotion/Fall Prevention:   activity supervised   clutter free environment maintained   increased rounding and observation   lighting adjusted   nonskid shoes/slippers when out of bed   mobility aid in reach   room near nurse's station     Problem: Fatigue  Goal: Improved Activity Tolerance  Outcome: Progressing     Problem: Chemotherapy Effects  Goal: Anemia Symptom Improvement  Outcome: Progressing  Intervention: Monitor and Manage Anemia  Recent Flowsheet Documentation  Taken  5/26/2024 2126 by Dolores Mathias RN  Safety Promotion/Fall Prevention:   activity supervised   clutter free environment maintained   increased rounding and observation   lighting adjusted   nonskid shoes/slippers when out of bed   mobility aid in reach   room near nurse's station  Goal: Safety Maintained  Outcome: Progressing  Intervention: Promote Safe Chemotherapy Delivery  Recent Flowsheet Documentation  Taken 5/26/2024 2126 by Dolores Mathias RN  Infection Prevention:   cohorting utilized   rest/sleep promoted  Goal: Absence of Hematuria  Outcome: Progressing  Intervention: Prevent or Manage Hemorrhagic Cystitis  Recent Flowsheet Documentation  Taken 5/26/2024 2200 by Dolores Mathias RN  Pain Management Interventions: medication (see MAR)  Taken 5/26/2024 2126 by Dolores Mathias RN  Pain Management Interventions: declines  Goal: Nausea and Vomiting Relief  Outcome: Progressing  Goal: Neurotoxicity Symptom Control  Outcome: Progressing  Goal: Absence of Infection  Outcome: Progressing  Intervention: Prevent Infection and Maximize Resistance  Recent Flowsheet Documentation  Taken 5/26/2024 2126 by Dolores Mathias RN  Infection Prevention:   cohorting utilized   rest/sleep promoted  Goal: Absence of Bleeding  Outcome: Progressing     Goal Outcome Evaluation:      Plan of Care Reviewed With: patient    Overall Patient Progress: improvingOverall Patient Progress: improving    Outcome Evaluation: Pain controlled with tylenol. PORT infusing. Bed alarm on.

## 2024-05-28 LAB
ATRIAL RATE - MUSE: 61 BPM
ATRIAL RATE - MUSE: 61 BPM
DIASTOLIC BLOOD PRESSURE - MUSE: NORMAL MMHG
DIASTOLIC BLOOD PRESSURE - MUSE: NORMAL MMHG
INTERPRETATION ECG - MUSE: NORMAL
INTERPRETATION ECG - MUSE: NORMAL
P AXIS - MUSE: 118 DEGREES
P AXIS - MUSE: NORMAL DEGREES
PR INTERVAL - MUSE: 126 MS
PR INTERVAL - MUSE: 130 MS
QRS DURATION - MUSE: 154 MS
QRS DURATION - MUSE: 154 MS
QT - MUSE: 506 MS
QT - MUSE: 508 MS
QTC - MUSE: 509 MS
QTC - MUSE: 511 MS
R AXIS - MUSE: 151 DEGREES
R AXIS - MUSE: 266 DEGREES
SYSTOLIC BLOOD PRESSURE - MUSE: NORMAL MMHG
SYSTOLIC BLOOD PRESSURE - MUSE: NORMAL MMHG
T AXIS - MUSE: -30 DEGREES
T AXIS - MUSE: 88 DEGREES
VENTRICULAR RATE- MUSE: 61 BPM
VENTRICULAR RATE- MUSE: 61 BPM

## 2024-05-29 NOTE — UTILIZATION REVIEW
Admission Status; Secondary Review Determination       As part of the [place] Utilization review plan, a self-audit is done on Medicare inpatient admission with less than 2 midnights stay. The 2014 IPPS Final Rule allows outpatient billing in the event that a hospital determines that an inpatient admission was not medically necessary under utilization review process.          (x) Outpatient status would be Appropriate- Short Stay- Post discharge review.     RATIONALE FOR DETERMINATION    84-year-old female with a history of HTN, HLP, pre-T2DM, CAD with ischemic CM (EF 40% in 7/2023), s/p biV pacer and ICD, PAF on apixaban, PAD, CKD 3b (creatinine 1.5), and COPD, was diagnosed with poorly differentiated adenocarcinoma of the lung (Stage 3) with mets to hilar and paratracheal nodes in 3/2024. She began carboplatin/paclitaxel and XRT on 4/15/2024. Admitted on 5/26/2024 with SOB and generalized weakness post-6th chemo round. Symptoms include nausea, LQ abdominal pain, body aches, shakiness, and weakness, without chest pain, fever, or cough. VSS, afebrile, with stable CKD, troponins flat at 20, pancytopenia without neutropenia, negative UA, Covid/flu/RSV tests, and normal CXR. Feeling better and ready for discharge with ongoing pancytopenia likely from chemo.  This account and medical record number for a Medicare beneficiary was an inpatient short stay (<2 midnights) and didn't meet medical necessity for inpatient admission. We recommend billing outpatient services based on the severity of illness, intensity of service provided and the inpatient length of stay.  Please contact me within one week of receiving this letter only if you disagree with this determination. If you concur, no further action is needed.          The information on this document is developed by the utilization review team in order for the business office to ensure compliance.  This only denotes the appropriateness of proper admission status and  "does not reflect the quality of care rendered.         The definitions of Inpatient Status and Observation Status used in making the determination above are those provided in the CMS Coverage Manual, Chapter 1 and Chapter 6, section 70.4.      Sincerely,       HOLLI ADAMS MD    System Medical Director  Utilization  .        Admission Status; Secondary Review Determination         Under the authority of the Utilization Management Committee, the utilization review process indicated a secondary review on the above patient.  The review outcome is based on review of the medical records, discussions with staff, and applying clinical experience noted on the date of the review.          (x) Observation Status Appropriate - This patient does not meet hospital inpatient criteria and is placed in observation status. If this patient's primary payer is Medicare and was admitted as an inpatient, Condition Code 44 should be used and patient status changed to \"observation\".     RATIONALE FOR DETERMINATION     The severity of illness, intensity of service provided, expected LOS and risk for adverse outcome make the care appropriate for further observation; however, doesn't meet criteria for hospital inpatient admission. Dr  notified of this determination.    This document was produced using voice recognition software.      The information on this document is developed by the utilization review team in order for the business office to ensure compliance.  This only denotes the appropriateness of proper admission status and does not reflect the quality of care rendered.         The definitions of Inpatient Status and Observation Status used in making the determination above are those provided in the CMS Coverage Manual, Chapter 1 and Chapter 6, section 70.4.      Sincerely,     HOLLI ADAMS MD    System Medical Director  Utilization .    "

## 2024-05-31 LAB — BACTERIA BLD CULT: NO GROWTH

## 2024-06-01 LAB — BACTERIA BLD CULT: NO GROWTH
